# Patient Record
Sex: MALE | Race: WHITE | NOT HISPANIC OR LATINO | Employment: FULL TIME | URBAN - NONMETROPOLITAN AREA
[De-identification: names, ages, dates, MRNs, and addresses within clinical notes are randomized per-mention and may not be internally consistent; named-entity substitution may affect disease eponyms.]

---

## 2020-10-09 ENCOUNTER — HOSPITAL ENCOUNTER (EMERGENCY)
Facility: HOSPITAL | Age: 25
Discharge: LEFT BEFORE DISCHARGE | End: 2020-10-09

## 2020-10-10 ENCOUNTER — APPOINTMENT (OUTPATIENT)
Dept: GENERAL RADIOLOGY | Facility: HOSPITAL | Age: 25
End: 2020-10-10

## 2020-10-10 ENCOUNTER — HOSPITAL ENCOUNTER (INPATIENT)
Facility: HOSPITAL | Age: 25
LOS: 1 days | Discharge: HOME | End: 2020-10-11
Attending: EMERGENCY MEDICINE | Admitting: INTERNAL MEDICINE

## 2020-10-10 DIAGNOSIS — I38 ENDOCARDITIS, UNSPECIFIED CHRONICITY, UNSPECIFIED ENDOCARDITIS TYPE: Primary | ICD-10-CM

## 2020-10-10 PROBLEM — I10 UNCONTROLLED HYPERTENSION: Status: ACTIVE | Noted: 2020-10-10

## 2020-10-10 PROBLEM — E87.6 HYPOKALEMIA: Status: ACTIVE | Noted: 2020-10-10

## 2020-10-10 PROBLEM — F10.10 ALCOHOL ABUSE: Status: ACTIVE | Noted: 2020-10-10

## 2020-10-10 PROBLEM — I33.0 BACTERIAL ENDOCARDITIS: Status: ACTIVE | Noted: 2020-10-10

## 2020-10-10 PROBLEM — Z72.0 TOBACCO ABUSE: Status: ACTIVE | Noted: 2020-10-10

## 2020-10-10 PROBLEM — Z95.2 HISTORY OF MECHANICAL AORTIC VALVE REPLACEMENT: Status: ACTIVE | Noted: 2020-10-10

## 2020-10-10 PROBLEM — Z79.01 CHRONIC ANTICOAGULATION: Status: ACTIVE | Noted: 2020-10-10

## 2020-10-10 PROBLEM — F19.11 HISTORY OF INTRAVENOUS DRUG ABUSE (HCC): Status: ACTIVE | Noted: 2020-10-10

## 2020-10-10 LAB
AMPHET+METHAMPHET UR QL: NEGATIVE
AMPHETAMINES UR QL: NEGATIVE
ANION GAP SERPL CALCULATED.3IONS-SCNC: 11 MMOL/L (ref 5–15)
ANION GAP SERPL CALCULATED.3IONS-SCNC: 8 MMOL/L (ref 5–15)
APTT PPP: 28.8 SECONDS (ref 24.1–35)
BARBITURATES UR QL SCN: NEGATIVE
BASOPHILS # BLD AUTO: 0.06 10*3/MM3 (ref 0–0.2)
BASOPHILS # BLD AUTO: 0.06 10*3/MM3 (ref 0–0.2)
BASOPHILS NFR BLD AUTO: 0.5 % (ref 0–1.5)
BASOPHILS NFR BLD AUTO: 0.7 % (ref 0–1.5)
BENZODIAZ UR QL SCN: NEGATIVE
BUN SERPL-MCNC: 13 MG/DL (ref 6–20)
BUN SERPL-MCNC: 14 MG/DL (ref 6–20)
BUN/CREAT SERPL: 14.8 (ref 7–25)
BUN/CREAT SERPL: 18.2 (ref 7–25)
BUPRENORPHINE SERPL-MCNC: NEGATIVE NG/ML
CALCIUM SPEC-SCNC: 9 MG/DL (ref 8.6–10.5)
CALCIUM SPEC-SCNC: 9.2 MG/DL (ref 8.6–10.5)
CANNABINOIDS SERPL QL: POSITIVE
CHLORIDE SERPL-SCNC: 102 MMOL/L (ref 98–107)
CHLORIDE SERPL-SCNC: 104 MMOL/L (ref 98–107)
CK SERPL-CCNC: 123 U/L (ref 20–200)
CO2 SERPL-SCNC: 25 MMOL/L (ref 22–29)
CO2 SERPL-SCNC: 28 MMOL/L (ref 22–29)
COCAINE UR QL: NEGATIVE
CREAT SERPL-MCNC: 0.77 MG/DL (ref 0.76–1.27)
CREAT SERPL-MCNC: 0.88 MG/DL (ref 0.76–1.27)
CRP SERPL-MCNC: 0.36 MG/DL (ref 0–0.5)
DEPRECATED RDW RBC AUTO: 39.4 FL (ref 37–54)
DEPRECATED RDW RBC AUTO: 39.6 FL (ref 37–54)
EOSINOPHIL # BLD AUTO: 0.06 10*3/MM3 (ref 0–0.4)
EOSINOPHIL # BLD AUTO: 0.07 10*3/MM3 (ref 0–0.4)
EOSINOPHIL NFR BLD AUTO: 0.5 % (ref 0.3–6.2)
EOSINOPHIL NFR BLD AUTO: 0.8 % (ref 0.3–6.2)
ERYTHROCYTE [DISTWIDTH] IN BLOOD BY AUTOMATED COUNT: 12.6 % (ref 12.3–15.4)
ERYTHROCYTE [DISTWIDTH] IN BLOOD BY AUTOMATED COUNT: 12.7 % (ref 12.3–15.4)
ERYTHROCYTE [SEDIMENTATION RATE] IN BLOOD: 1 MM/HR (ref 0–15)
GFR SERPL CREATININE-BSD FRML MDRD: 105 ML/MIN/1.73
GFR SERPL CREATININE-BSD FRML MDRD: 122 ML/MIN/1.73
GLUCOSE SERPL-MCNC: 103 MG/DL (ref 65–99)
GLUCOSE SERPL-MCNC: 122 MG/DL (ref 65–99)
HCT VFR BLD AUTO: 45.6 % (ref 37.5–51)
HCT VFR BLD AUTO: 45.9 % (ref 37.5–51)
HGB BLD-MCNC: 15.4 G/DL (ref 13–17.7)
HGB BLD-MCNC: 15.8 G/DL (ref 13–17.7)
IMM GRANULOCYTES # BLD AUTO: 0.03 10*3/MM3 (ref 0–0.05)
IMM GRANULOCYTES # BLD AUTO: 0.1 10*3/MM3 (ref 0–0.05)
IMM GRANULOCYTES NFR BLD AUTO: 0.3 % (ref 0–0.5)
IMM GRANULOCYTES NFR BLD AUTO: 0.9 % (ref 0–0.5)
INR PPP: 1.11 (ref 0.91–1.09)
INR PPP: 1.17 (ref 0.91–1.09)
LYMPHOCYTES # BLD AUTO: 1.36 10*3/MM3 (ref 0.7–3.1)
LYMPHOCYTES # BLD AUTO: 1.71 10*3/MM3 (ref 0.7–3.1)
LYMPHOCYTES NFR BLD AUTO: 11.8 % (ref 19.6–45.3)
LYMPHOCYTES NFR BLD AUTO: 18.8 % (ref 19.6–45.3)
MAGNESIUM SERPL-MCNC: 2.1 MG/DL (ref 1.6–2.6)
MCH RBC QN AUTO: 29.3 PG (ref 26.6–33)
MCH RBC QN AUTO: 29.6 PG (ref 26.6–33)
MCHC RBC AUTO-ENTMCNC: 33.8 G/DL (ref 31.5–35.7)
MCHC RBC AUTO-ENTMCNC: 34.4 G/DL (ref 31.5–35.7)
MCV RBC AUTO: 86.1 FL (ref 79–97)
MCV RBC AUTO: 86.9 FL (ref 79–97)
METHADONE UR QL SCN: NEGATIVE
MONOCYTES # BLD AUTO: 0.73 10*3/MM3 (ref 0.1–0.9)
MONOCYTES # BLD AUTO: 0.74 10*3/MM3 (ref 0.1–0.9)
MONOCYTES NFR BLD AUTO: 6.4 % (ref 5–12)
MONOCYTES NFR BLD AUTO: 8 % (ref 5–12)
NEUTROPHILS NFR BLD AUTO: 6.49 10*3/MM3 (ref 1.7–7)
NEUTROPHILS NFR BLD AUTO: 71.4 % (ref 42.7–76)
NEUTROPHILS NFR BLD AUTO: 79.9 % (ref 42.7–76)
NEUTROPHILS NFR BLD AUTO: 9.24 10*3/MM3 (ref 1.7–7)
NRBC BLD AUTO-RTO: 0 /100 WBC (ref 0–0.2)
NRBC BLD AUTO-RTO: 0 /100 WBC (ref 0–0.2)
OPIATES UR QL: NEGATIVE
OXYCODONE UR QL SCN: NEGATIVE
PCP UR QL SCN: NEGATIVE
PLATELET # BLD AUTO: 217 10*3/MM3 (ref 140–450)
PLATELET # BLD AUTO: 220 10*3/MM3 (ref 140–450)
PMV BLD AUTO: 10.3 FL (ref 6–12)
PMV BLD AUTO: 10.3 FL (ref 6–12)
POTASSIUM SERPL-SCNC: 3 MMOL/L (ref 3.5–5.2)
POTASSIUM SERPL-SCNC: 3.4 MMOL/L (ref 3.5–5.2)
PROPOXYPH UR QL: NEGATIVE
PROTHROMBIN TIME: 13.9 SECONDS (ref 11.9–14.6)
PROTHROMBIN TIME: 14.5 SECONDS (ref 11.9–14.6)
RBC # BLD AUTO: 5.25 10*6/MM3 (ref 4.14–5.8)
RBC # BLD AUTO: 5.33 10*6/MM3 (ref 4.14–5.8)
SARS-COV-2 RDRP RESP QL NAA+PROBE: NOT DETECTED
SODIUM SERPL-SCNC: 138 MMOL/L (ref 136–145)
SODIUM SERPL-SCNC: 140 MMOL/L (ref 136–145)
TRICYCLICS UR QL SCN: NEGATIVE
TROPONIN T SERPL-MCNC: <0.01 NG/ML (ref 0–0.03)
WBC # BLD AUTO: 11.56 10*3/MM3 (ref 3.4–10.8)
WBC # BLD AUTO: 9.09 10*3/MM3 (ref 3.4–10.8)

## 2020-10-10 PROCEDURE — 82550 ASSAY OF CK (CPK): CPT | Performed by: EMERGENCY MEDICINE

## 2020-10-10 PROCEDURE — 25010000003 POTASSIUM CHLORIDE 10 MEQ/100ML SOLUTION: Performed by: FAMILY MEDICINE

## 2020-10-10 PROCEDURE — 85730 THROMBOPLASTIN TIME PARTIAL: CPT | Performed by: EMERGENCY MEDICINE

## 2020-10-10 PROCEDURE — 25010000002 HYDRALAZINE PER 20 MG: Performed by: EMERGENCY MEDICINE

## 2020-10-10 PROCEDURE — 25010000002 VANCOMYCIN PER 500 MG: Performed by: FAMILY MEDICINE

## 2020-10-10 PROCEDURE — 93010 ELECTROCARDIOGRAM REPORT: CPT | Performed by: INTERNAL MEDICINE

## 2020-10-10 PROCEDURE — 85025 COMPLETE CBC W/AUTO DIFF WBC: CPT | Performed by: EMERGENCY MEDICINE

## 2020-10-10 PROCEDURE — 25010000002 ENOXAPARIN PER 10 MG: Performed by: EMERGENCY MEDICINE

## 2020-10-10 PROCEDURE — 85025 COMPLETE CBC W/AUTO DIFF WBC: CPT | Performed by: FAMILY MEDICINE

## 2020-10-10 PROCEDURE — 71045 X-RAY EXAM CHEST 1 VIEW: CPT

## 2020-10-10 PROCEDURE — 87040 BLOOD CULTURE FOR BACTERIA: CPT | Performed by: EMERGENCY MEDICINE

## 2020-10-10 PROCEDURE — 85610 PROTHROMBIN TIME: CPT | Performed by: EMERGENCY MEDICINE

## 2020-10-10 PROCEDURE — 80048 BASIC METABOLIC PNL TOTAL CA: CPT | Performed by: EMERGENCY MEDICINE

## 2020-10-10 PROCEDURE — 99254 IP/OBS CNSLTJ NEW/EST MOD 60: CPT | Performed by: INTERNAL MEDICINE

## 2020-10-10 PROCEDURE — 25010000002 VANCOMYCIN: Performed by: EMERGENCY MEDICINE

## 2020-10-10 PROCEDURE — 80048 BASIC METABOLIC PNL TOTAL CA: CPT | Performed by: FAMILY MEDICINE

## 2020-10-10 PROCEDURE — 86140 C-REACTIVE PROTEIN: CPT | Performed by: FAMILY MEDICINE

## 2020-10-10 PROCEDURE — 87635 SARS-COV-2 COVID-19 AMP PRB: CPT | Performed by: EMERGENCY MEDICINE

## 2020-10-10 PROCEDURE — 99284 EMERGENCY DEPT VISIT MOD MDM: CPT

## 2020-10-10 PROCEDURE — 25010000002 ENOXAPARIN PER 10 MG: Performed by: FAMILY MEDICINE

## 2020-10-10 PROCEDURE — 80306 DRUG TEST PRSMV INSTRMNT: CPT | Performed by: EMERGENCY MEDICINE

## 2020-10-10 PROCEDURE — 83735 ASSAY OF MAGNESIUM: CPT | Performed by: FAMILY MEDICINE

## 2020-10-10 PROCEDURE — 93005 ELECTROCARDIOGRAM TRACING: CPT | Performed by: EMERGENCY MEDICINE

## 2020-10-10 PROCEDURE — 85610 PROTHROMBIN TIME: CPT | Performed by: FAMILY MEDICINE

## 2020-10-10 PROCEDURE — C9803 HOPD COVID-19 SPEC COLLECT: HCPCS | Performed by: EMERGENCY MEDICINE

## 2020-10-10 PROCEDURE — 25010000002 CEFTRIAXONE PER 250 MG: Performed by: EMERGENCY MEDICINE

## 2020-10-10 PROCEDURE — 85651 RBC SED RATE NONAUTOMATED: CPT | Performed by: FAMILY MEDICINE

## 2020-10-10 PROCEDURE — 25010000002 ONDANSETRON PER 1 MG: Performed by: FAMILY MEDICINE

## 2020-10-10 PROCEDURE — 84484 ASSAY OF TROPONIN QUANT: CPT | Performed by: EMERGENCY MEDICINE

## 2020-10-10 RX ORDER — METOPROLOL TARTRATE 5 MG/5ML
5 INJECTION INTRAVENOUS EVERY 6 HOURS SCHEDULED
Status: DISCONTINUED | OUTPATIENT
Start: 2020-10-10 | End: 2020-10-10

## 2020-10-10 RX ORDER — THIAMINE MONONITRATE (VIT B1) 100 MG
100 TABLET ORAL ONCE
Status: COMPLETED | OUTPATIENT
Start: 2020-10-10 | End: 2020-10-10

## 2020-10-10 RX ORDER — LORAZEPAM 1 MG/1
1 TABLET ORAL
Status: DISCONTINUED | OUTPATIENT
Start: 2020-10-10 | End: 2020-10-11 | Stop reason: HOSPADM

## 2020-10-10 RX ORDER — VANCOMYCIN HYDROCHLORIDE 1 G/200ML
1000 INJECTION, SOLUTION INTRAVENOUS EVERY 8 HOURS
Status: DISCONTINUED | OUTPATIENT
Start: 2020-10-10 | End: 2020-10-11 | Stop reason: HOSPADM

## 2020-10-10 RX ORDER — WARFARIN SODIUM 5 MG/1
5 TABLET ORAL
Status: DISCONTINUED | OUTPATIENT
Start: 2020-10-10 | End: 2020-10-11 | Stop reason: HOSPADM

## 2020-10-10 RX ORDER — SODIUM CHLORIDE 9 MG/ML
75 INJECTION, SOLUTION INTRAVENOUS CONTINUOUS
Status: DISCONTINUED | OUTPATIENT
Start: 2020-10-10 | End: 2020-10-11 | Stop reason: HOSPADM

## 2020-10-10 RX ORDER — HYDRALAZINE HYDROCHLORIDE 20 MG/ML
20 INJECTION INTRAMUSCULAR; INTRAVENOUS ONCE
Status: COMPLETED | OUTPATIENT
Start: 2020-10-10 | End: 2020-10-10

## 2020-10-10 RX ORDER — ONDANSETRON 2 MG/ML
4 INJECTION INTRAMUSCULAR; INTRAVENOUS EVERY 6 HOURS PRN
Status: DISCONTINUED | OUTPATIENT
Start: 2020-10-10 | End: 2020-10-11 | Stop reason: HOSPADM

## 2020-10-10 RX ORDER — LORAZEPAM 2 MG/ML
2 INJECTION INTRAMUSCULAR
Status: DISCONTINUED | OUTPATIENT
Start: 2020-10-10 | End: 2020-10-11 | Stop reason: HOSPADM

## 2020-10-10 RX ORDER — SODIUM CHLORIDE 0.9 % (FLUSH) 0.9 %
10 SYRINGE (ML) INJECTION EVERY 12 HOURS SCHEDULED
Status: DISCONTINUED | OUTPATIENT
Start: 2020-10-10 | End: 2020-10-11 | Stop reason: HOSPADM

## 2020-10-10 RX ORDER — SODIUM CHLORIDE 0.9 % (FLUSH) 0.9 %
10 SYRINGE (ML) INJECTION AS NEEDED
Status: DISCONTINUED | OUTPATIENT
Start: 2020-10-10 | End: 2020-10-11 | Stop reason: HOSPADM

## 2020-10-10 RX ORDER — WARFARIN SODIUM 5 MG/1
5 TABLET ORAL
Status: COMPLETED | OUTPATIENT
Start: 2020-10-10 | End: 2020-10-10

## 2020-10-10 RX ORDER — POTASSIUM CHLORIDE 750 MG/1
40 CAPSULE, EXTENDED RELEASE ORAL ONCE
Status: COMPLETED | OUTPATIENT
Start: 2020-10-10 | End: 2020-10-10

## 2020-10-10 RX ORDER — LISINOPRIL 5 MG/1
5 TABLET ORAL
Status: DISCONTINUED | OUTPATIENT
Start: 2020-10-10 | End: 2020-10-11 | Stop reason: HOSPADM

## 2020-10-10 RX ORDER — NICOTINE 21 MG/24HR
1 PATCH, TRANSDERMAL 24 HOURS TRANSDERMAL EVERY 24 HOURS
Status: DISCONTINUED | OUTPATIENT
Start: 2020-10-10 | End: 2020-10-11 | Stop reason: HOSPADM

## 2020-10-10 RX ORDER — POTASSIUM CHLORIDE 7.45 MG/ML
10 INJECTION INTRAVENOUS
Status: COMPLETED | OUTPATIENT
Start: 2020-10-10 | End: 2020-10-10

## 2020-10-10 RX ORDER — VANCOMYCIN HYDROCHLORIDE 1 G/200ML
1000 INJECTION, SOLUTION INTRAVENOUS EVERY 12 HOURS
Status: DISCONTINUED | OUTPATIENT
Start: 2020-10-10 | End: 2020-10-10 | Stop reason: DRUGHIGH

## 2020-10-10 RX ORDER — LORAZEPAM 2 MG/ML
1 INJECTION INTRAMUSCULAR
Status: DISCONTINUED | OUTPATIENT
Start: 2020-10-10 | End: 2020-10-11 | Stop reason: HOSPADM

## 2020-10-10 RX ORDER — LORAZEPAM 1 MG/1
2 TABLET ORAL
Status: DISCONTINUED | OUTPATIENT
Start: 2020-10-10 | End: 2020-10-11 | Stop reason: HOSPADM

## 2020-10-10 RX ORDER — LABETALOL HYDROCHLORIDE 5 MG/ML
10 INJECTION, SOLUTION INTRAVENOUS EVERY 6 HOURS PRN
Status: DISCONTINUED | OUTPATIENT
Start: 2020-10-10 | End: 2020-10-11 | Stop reason: HOSPADM

## 2020-10-10 RX ORDER — ACETAMINOPHEN 325 MG/1
650 TABLET ORAL EVERY 4 HOURS PRN
Status: DISCONTINUED | OUTPATIENT
Start: 2020-10-10 | End: 2020-10-11 | Stop reason: HOSPADM

## 2020-10-10 RX ADMIN — POTASSIUM CHLORIDE 40 MEQ: 750 CAPSULE, EXTENDED RELEASE ORAL at 14:09

## 2020-10-10 RX ADMIN — POTASSIUM CHLORIDE 10 MEQ: 7.46 INJECTION, SOLUTION INTRAVENOUS at 10:28

## 2020-10-10 RX ADMIN — ACETAMINOPHEN 650 MG: 325 TABLET, FILM COATED ORAL at 08:10

## 2020-10-10 RX ADMIN — METOPROLOL TARTRATE 25 MG: 25 TABLET, FILM COATED ORAL at 14:09

## 2020-10-10 RX ADMIN — VANCOMYCIN HYDROCHLORIDE 1000 MG: 1 INJECTION, SOLUTION INTRAVENOUS at 14:09

## 2020-10-10 RX ADMIN — HYDRALAZINE HYDROCHLORIDE 20 MG: 20 INJECTION INTRAMUSCULAR; INTRAVENOUS at 02:06

## 2020-10-10 RX ADMIN — WARFARIN SODIUM 5 MG: 5 TABLET ORAL at 17:01

## 2020-10-10 RX ADMIN — CEFTRIAXONE SODIUM 2 G: 2 INJECTION, POWDER, FOR SOLUTION INTRAMUSCULAR; INTRAVENOUS at 03:51

## 2020-10-10 RX ADMIN — SODIUM CHLORIDE, PRESERVATIVE FREE 10 ML: 5 INJECTION INTRAVENOUS at 08:05

## 2020-10-10 RX ADMIN — SODIUM CHLORIDE, PRESERVATIVE FREE 10 ML: 5 INJECTION INTRAVENOUS at 21:18

## 2020-10-10 RX ADMIN — METOPROLOL TARTRATE 25 MG: 25 TABLET, FILM COATED ORAL at 21:17

## 2020-10-10 RX ADMIN — SODIUM CHLORIDE 75 ML/HR: 9 INJECTION, SOLUTION INTRAVENOUS at 08:19

## 2020-10-10 RX ADMIN — POTASSIUM CHLORIDE 10 MEQ: 7.46 INJECTION, SOLUTION INTRAVENOUS at 08:19

## 2020-10-10 RX ADMIN — HYDRALAZINE HYDROCHLORIDE 20 MG: 20 INJECTION INTRAMUSCULAR; INTRAVENOUS at 03:58

## 2020-10-10 RX ADMIN — ENOXAPARIN SODIUM 90 MG: 100 INJECTION SUBCUTANEOUS at 02:21

## 2020-10-10 RX ADMIN — WARFARIN SODIUM 5 MG: 5 TABLET ORAL at 03:55

## 2020-10-10 RX ADMIN — ENOXAPARIN SODIUM 90 MG: 100 INJECTION SUBCUTANEOUS at 17:01

## 2020-10-10 RX ADMIN — VANCOMYCIN HYDROCHLORIDE 1750 MG: 1 INJECTION, POWDER, LYOPHILIZED, FOR SOLUTION INTRAVENOUS at 04:31

## 2020-10-10 RX ADMIN — METOPROLOL TARTRATE 5 MG: 5 INJECTION INTRAVENOUS at 08:05

## 2020-10-10 RX ADMIN — LISINOPRIL 5 MG: 5 TABLET ORAL at 14:09

## 2020-10-10 RX ADMIN — VANCOMYCIN HYDROCHLORIDE 1000 MG: 1 INJECTION, SOLUTION INTRAVENOUS at 21:17

## 2020-10-10 RX ADMIN — ONDANSETRON HYDROCHLORIDE 4 MG: 2 SOLUTION INTRAMUSCULAR; INTRAVENOUS at 11:41

## 2020-10-10 RX ADMIN — THIAMINE HCL TAB 100 MG 100 MG: 100 TAB at 14:34

## 2020-10-10 NOTE — CONSULTS
"[facility], [facility] Consult Note    Referring Provider: giuseppe  Reason for Consultation: possible endocarditis    Patient Care Team:  Provider, No Known as PCP - General    Chief complaint chills    Subjective .     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  He presents after having some chills and night sweats.  He denies CP, heart palpitations, or SOB.  He denies further IV drug use.  He denies objective or subjective fever.      Review of Systems  A 10-point review of systems is obtained and negative except for otherwise mentioned above.    History  Past Medical History:   Diagnosis Date   • Endocarditis 2011   ,   Past Surgical History:   Procedure Laterality Date   • AORTIC VALVE REPAIR/REPLACEMENT  2011   , History reviewed. No pertinent family history.,   Social History     Tobacco Use   • Smoking status: Heavy Tobacco Smoker     Packs/day: 0.50     Years: 13.00     Pack years: 6.50     Types: Cigarettes   Substance Use Topics   • Alcohol use: Yes     Alcohol/week: 24.0 standard drinks     Types: 24 Cans of beer per week   • Drug use: Yes     Frequency: 7.0 times per week     Types: Marijuana   ,   No medications prior to admission.    and Allergies:  Patient has no known allergies.    Objective     Vital Sign Min/Max for last 24 hours  Temp  Min: 97.9 °F (36.6 °C)  Max: 98.6 °F (37 °C)   BP  Min: 137/88  Max: 185/124   Pulse  Min: 69  Max: 126   Resp  Min: 18  Max: 20   SpO2  Min: 95 %  Max: 100 %   No data recorded   Weight  Min: 88.5 kg (195 lb)  Max: 89.1 kg (196 lb 6.4 oz)     Flowsheet Rows      First Filed Value   Admission Height  162.6 cm (64\") Documented at 10/10/2020 0019   Admission Weight  88.5 kg (195 lb) Documented at 10/10/2020 0019             Physical Exam:     General Appearance:    Alert, cooperative, in no acute distress "   Head:    Normocephalic, without obvious abnormality, atraumatic   Eyes:            Lids and lashes normal, conjunctivae and sclerae normal, no   icterus, PERRLA, EOMI   Throat:   Oral mucosa pink and moist   Neck:   No adenopathy, supple, trachea midline, no thyromegaly, no   carotid bruit, no JVD   Lungs:     Clear to auscultation bilaterally,respirations regular, even     and unlabored    Heart:    Regular rhythm and normal rate, normal S1 and S2, no            murmur, no gallop, no rub, no click, crisp heart tones   Chest Wall:    No abnormalities observed   Abdomen:     Normal bowel sounds present in all four quadrants, no       masses, no organomegaly, soft non-tender, non-distended    Extremities:   No edema, no cyanosis, no clubbing   Pulses:   Pulses palpable and equal bilaterally   Skin:   No bleeding, bruising or rash   Psychiatric:   Displays appropriate mood and affect           Results Review:    I reviewed the patient's new clinical results.    Results from last 7 days   Lab Units 10/10/20  0646   WBC 10*3/mm3 11.56*   HEMOGLOBIN g/dL 15.8   HEMATOCRIT % 45.9   PLATELETS 10*3/mm3 217     Results from last 7 days   Lab Units 10/10/20  0646   SODIUM mmol/L 140   POTASSIUM mmol/L 3.0*   CHLORIDE mmol/L 104   CO2 mmol/L 25.0   BUN mg/dL 13   CREATININE mg/dL 0.88   GLUCOSE mg/dL 122*   CALCIUM mg/dL 9.0     Results from last 7 days   Lab Units 10/10/20  0646   SODIUM mmol/L 140   POTASSIUM mmol/L 3.0*   CHLORIDE mmol/L 104   CO2 mmol/L 25.0   BUN mg/dL 13   CREATININE mg/dL 0.88   CALCIUM mg/dL 9.0   GLUCOSE mg/dL 122*     Results from last 7 days   Lab Units 10/10/20  0159   CK TOTAL U/L 123   TROPONIN T ng/mL <0.010         Assessment/Plan       Bacterial endocarditis, rule out    Uncontrolled hypertension    Alcohol abuse    Tobacco abuse    History of intravenous drug abuse (CMS/HCC)    Chronic anticoagulation    History of mechanical aortic valve replacement    Hypokalemia  Subtherapeutic INR    D/t  h/o IVDA and bacterial endocarditis and now presently with night sweats and chills, there was concern for recurrent SBE.  Will check 2D echo to evaluate valves.  Agree with Lovenox coverage.  Encourage pt compliance.  Thank you for the consult.  Further recommendations to follow from Dr. Villatoro.    I discussed the patient's findings and my recommendations with patient and primary care team    Jagruti Lam PA-C  10/10/20  13:23 CDT

## 2020-10-10 NOTE — PROGRESS NOTES
Admitted after midnight by Dr. Smith.     Discussed with his nurse, Jeanna.     Patient has a history of a mechanical aortic valve replacement several years ago secondary to bacterial endocarditis from IV drug abuse.  He was admitted at the request of the ER as he has been experiencing night sweats.  There is some concern about possible recurrence.  He apparently continues to drink and smoke heavily, but denies IVDA.  Urine drug screen positive for THC.    Blood cultures negative thus far.  The patient was placed on ceftriaxone and vancomycin for now.  CRP completely normal.  No fever.  Slight leukocytosis.    Cardiology was consulted to evaluate and possibly do a transesophageal echocardiogram.    He is on therapeutic Lovenox for now as he is subtherapeutic with regards to his Coumadin.  INR 1.17.    CIWA protocol.  Prophylactic vitamin replacement.  Offered a nicotine patch and counseled for tobacco cessation.    Replace potassium.  Magnesium normal.  Blood pressure improving with as needed medications, but will start scheduled medications.    Active Hospital Problems    Diagnosis   • **Bacterial endocarditis, rule out   • History of intravenous drug abuse (CMS/HCC)   • Chronic anticoagulation   • History of mechanical aortic valve replacement   • Uncontrolled hypertension   • Alcohol abuse   • Tobacco abuse   • Hypokalemia     Electronically signed by Den Servin DO, 10/10/20, 11:59 AM CDT.

## 2020-10-10 NOTE — CONSULTS
"Pharmacy Dosing Service  Pharmacokinetics  Vancomycin Initial Evaluation  Assessment/Action/Plan:  Loading dose: 1,750 mg  Current Order: Vancomycin 1,000 mg IVPB every 8 hours (adjusted from 1,000 mg every 12 hours with accompanying Pharmacy to Dose Request)  Current end date/final dose: 10/15/20 at 0500  Levels: No levels ordered at this time  Additional antimicrobial agent(s): Rocephin 2gm IVPB every 24 hours    Vancomycin dosage initiated based on population pharmacokinetic parameters. Pharmacy will continue to follow daily and adjust dose accordingly.     Subjective:  Sher Drew is a 24 y.o. male with a Vancomycin \"Pharmacy to Dose\" consult for the treatment of Endocarditis , day 1 of 5 of treatment.    AUC Model Data: Luigi model  Regimen: 1000 mg every 8 hours for 15 doses.  Start time: 13:00 on 10/10/2020  Exposure target: AUC24 (range)400-600 mg/L.hr  AUC24,ss: 555 mg/L.hr  PAUC*: 73 %  Ctrough,ss: 16.7 mg/L  Pconc*: 41 %  Tox.: 12 %    Objective:  Ht: 162.6 cm (64\"); Wt: 89.1 kg (196 lb 6.4 oz)  Estimated Creatinine Clearance: 146.4 mL/min (by C-G formula based on SCr of 0.77 mg/dL).   Creatinine   Date Value Ref Range Status   10/10/2020 0.77 0.76 - 1.27 mg/dL Final      Lab Results   Component Value Date    WBC 9.09 10/10/2020      Baseline culture results:  Microbiology Results (last 10 days)       Procedure Component Value - Date/Time    COVID PRE-OP / PRE-PROCEDURE SCREENING ORDER (NO ISOLATION) - Swab, Nasal Cavity [[id number]]  (Normal) Collected: 10/10/20 0205    Lab Status: Final result Specimen: Swab from Nasal Cavity Updated: 10/10/20 0242    Narrative:      The following orders were created for panel order COVID PRE-OP / PRE-PROCEDURE SCREENING ORDER (NO ISOLATION) - Swab, Nasal Cavity.  Procedure                               Abnormality         Status                     ---------                               -----------         ------                     COVID-19, ARRIETA " IN-HOUS...[[id number]]  Normal              Final result                 Please view results for these tests on the individual orders.    COVID-19, [facility]BBOTT IN-HOUSE,NP Swab (NO TRANSPORT MEDIA) 2 HR TAT - Swab, Nasal Cavity [[id number]]  (Normal) Collected: 10/10/20 0205    Lab Status: Final result Specimen: Swab from Nasal Cavity Updated: 10/10/20 0242     COVID19 Not Detected    Narrative:      Fact sheet for providers: https://www.fda.gov/media/280233/download     Fact sheet for patients: https://www.fda.gov/media/299018/download            Lauri Flores PharmD  10/10/20 06:58 CDT

## 2020-10-10 NOTE — ED PROVIDER NOTES
"Subjective   23 y/o male who had valve replacement surgery in [place] at the age of 15 secondary to IVDA. He tells me he has not been compliant with his medications actually not having taken his coumadin in 3 months and similar with the rest of his cardiac medications. He arrives today for evaluation of night sweats for several weeks that he states is \"more than normal sweating\" as well as post prandial nausea that he states is intermittent stating he can \"sometimes eat a steak and feel fine but then other times eat something small and feel sick.\" He denies any fevers or chills, cp, sob, current IVDA, current nausea or vomiting, RESENDEZ, weakness, numbness or tingling. He arrives in [facility].           Family, social and past history reviewed as below, prior documentation of H and Ps and other documentation are reviewed:    No past medical history on file.    No past surgical history on file.    Social History    Socioeconomic History      Marital status: Single      Spouse name: Not on file      Number of children: Not on file      Years of education: Not on file      Highest education level: Not on file      Family history: reviewed and noncontributory           Review of Systems   All other systems reviewed and are negative.      No past medical history on file.    No Known Allergies    No past surgical history on file.    No family history on file.    Social History     Socioeconomic History   • Marital status: Single     Spouse name: Not on file   • Number of children: Not on file   • Years of education: Not on file   • Highest education level: Not on file           Objective   Physical Exam  Vitals signs and nursing note reviewed.   Constitutional:       Appearance: Normal appearance.   HENT:      Head: Normocephalic.      Nose: Nose normal.      Mouth/Throat:      Mouth: Mucous membranes are moist.   Eyes:      Extraocular Movements: Extraocular movements intact.      Pupils: Pupils are equal, round, and reactive to " light.   Neck:      Musculoskeletal: Normal range of motion and neck supple.   Cardiovascular:      Rate and Rhythm: Normal rate and regular rhythm.      Pulses: Normal pulses.      Heart sounds: Normal heart sounds.      Comments: Click from mechanical valve   Pulmonary:      Effort: Pulmonary effort is normal. No respiratory distress.      Breath sounds: Normal breath sounds. No stridor. No wheezing or rhonchi.   Abdominal:      General: Abdomen is flat. Bowel sounds are normal.   Musculoskeletal: Normal range of motion.         General: No swelling or tenderness.   Skin:     General: Skin is warm and dry.      Capillary Refill: Capillary refill takes less than 2 seconds.      Coloration: Skin is not jaundiced or pale.      Findings: No bruising, erythema, lesion or rash.      Comments: No nodules, no lesions    Neurological:      General: No focal deficit present.      Mental Status: He is alert and oriented to person, place, and time.   Psychiatric:         Mood and Affect: Mood normal.         Thought Content: Thought content normal.         Procedures           ED Course  ED Course as of Oct 10 0330   Sat Oct 10, 2020   0325 Dr. Sanchez recommends admission, lovenox and coumadin. He also feels the pateint may have endocarditis and recommends NOEMI in am    [JH]      ED Course User Index  [] Carter Manuel MD            XR Chest 1 View   ED Interpretation   cardiomegly        Labs Reviewed   BASIC METABOLIC PANEL - Abnormal; Notable for the following components:       Result Value    Glucose 103 (*)     Potassium 3.4 (*)     All other components within normal limits    Narrative:     GFR Normal >60  Chronic Kidney Disease <60  Kidney Failure <15     PROTIME-INR - Abnormal; Notable for the following components:    INR 1.11 (*)     All other components within normal limits   CBC WITH AUTO DIFFERENTIAL - Abnormal; Notable for the following components:    Lymphocyte % 18.8 (*)     All other components within  normal limits   COVID-19,[facility]OTT IN-HOUSE,NP SWAB (NO TRANSPORT MEDIA) 2 HR TAT - Normal    Narrative:     Fact sheet for providers: https://www.fda.gov/media/878241/download     Fact sheet for patients: https://www.fda.gov/media/971379/download   APTT - Normal   TROPONIN (IN-HOUSE) - Normal    Narrative:     Troponin T Reference Range:  <= 0.03 ng/mL-   Negative for AMI  >0.03 ng/mL-     Abnormal for myocardial necrosis.  Clinicians would have to utilize clinical acumen, EKG, Troponin and serial changes to determine if it is an Acute Myocardial Infarction or myocardial injury due to an underlying chronic condition.       Results may be falsely decreased if patient taking Biotin.     CK - Normal   COVID PRE-OP / PRE-PROCEDURE SCREENING ORDER (NO ISOLATION)    Narrative:     The following orders were created for panel order COVID PRE-OP / PRE-PROCEDURE SCREENING ORDER (NO ISOLATION) - Swab, Nasal Cavity.  Procedure                               Abnormality         Status                     ---------                               -----------         ------                     COVID-19, ABBOTT IN-HOUS...[[id number]]  Normal              Final result                 Please view results for these tests on the individual orders.   BLOOD CULTURE   BLOOD CULTURE   URINE DRUG SCREEN   CBC AND DIFFERENTIAL    Narrative:     The following orders were created for panel order CBC & Differential.  Procedure                               Abnormality         Status                     ---------                               -----------         ------                     CBC Auto Differential[[id number]]        Abnormal            Final result                 Please view results for these tests on the individual orders.                                       MDM    Final diagnoses:   Endocarditis, unspecified chronicity, unspecified endocarditis type            Carter Manuel MD  10/10/20 [id number]

## 2020-10-10 NOTE — H&P
"    [facility]  HISTORY AND PHYSICAL    Date of Admission: 10/10/2020  Primary Care Physician: Provider, No Known    Subjective     Chief Complaint: night sweats    History of Present Illness  24 year old male with AVR mechanical valve due to IVD endocarditis that presents with complaints of night sweats for a week. He stopped using IV drugs after surgery. Denies nausea, vomiting, diarrhea, abdominal pain, skin rash.     His blood pressure has been elevated on presentation. Patient had stopped taking his prescriptions. Concerns was with endocarditis Dr Manuel reported he spoke with Laura who recommended admission for evaluation and NOEMI.     Review of Systems   Otherwise complete ROS reviewed and negative except as mentioned in the HPI.    Past Medical History:   Past Medical History:   Diagnosis Date   • Endocarditis 2011     Past Surgical History:  Past Surgical History:   Procedure Laterality Date   • AORTIC VALVE REPAIR/REPLACEMENT  2011     Social History:  reports that he has been smoking cigarettes. He has a 6.50 pack-year smoking history. He does not have any smokeless tobacco history on file. He reports current alcohol use of about 24.0 standard drinks of alcohol per week. He reports current drug use. Frequency: 7.00 times per week. Drug: Marijuana.    Family History:  Hypertension    Allergies:  No Known Allergies  Medications:  Prior to Admission medications    Not on File     Objective     Vital Signs: BP (!) 185/124   Pulse 72   Temp 98.6 °F (37 °C) (Oral)   Resp 20   Ht 162.6 cm (64\")   Wt 88.5 kg (195 lb)   SpO2 99%   BMI 33.47 kg/m²   Physical Exam  Constitutional:       General: He is not in acute distress.     Appearance: He is ill-appearing. He is not diaphoretic.   HENT:      Head: Normocephalic and atraumatic.      Right Ear: External ear normal.      Left Ear: External ear normal.      Nose: Nose normal.      Mouth/Throat:      Mouth: " Mucous membranes are dry.   Eyes:      General: No scleral icterus.     Extraocular Movements: Extraocular movements intact.      Pupils: Pupils are equal, round, and reactive to light.   Neck:      Musculoskeletal: Normal range of motion and neck supple.   Cardiovascular:      Rate and Rhythm: Normal rate and regular rhythm.      Heart sounds: Murmur present.   Pulmonary:      Effort: Pulmonary effort is normal. No respiratory distress.      Breath sounds: Normal breath sounds. No stridor. No wheezing or rhonchi.   Abdominal:      General: Abdomen is flat. Bowel sounds are normal. There is no distension.      Palpations: There is no mass.   Musculoskeletal: Normal range of motion.         General: No swelling, tenderness, deformity or signs of injury.   Skin:     General: Skin is warm.      Capillary Refill: Capillary refill takes less than 2 seconds.      Coloration: Skin is not jaundiced or pale.      Findings: No bruising, erythema, lesion or rash.   Neurological:      General: No focal deficit present.      Mental Status: He is alert and oriented to person, place, and time. Mental status is at baseline.      Cranial Nerves: No cranial nerve deficit.      Sensory: No sensory deficit.      Motor: No weakness.      Coordination: Coordination normal.   Psychiatric:         Mood and Affect: Mood normal.         Behavior: Behavior normal.     Results Reviewed:  Lab Results (last 24 hours)     Procedure Component Value Units Date/Time    COVID PRE-OP / PRE-PROCEDURE SCREENING ORDER (NO ISOLATION) - Swab, Nasal Cavity [[id number]]  (Normal) Collected: 10/10/20 0205    Specimen: Swab from Nasal Cavity Updated: 10/10/20 0242    Narrative:      The following orders were created for panel order COVID PRE-OP / PRE-PROCEDURE SCREENING ORDER (NO ISOLATION) - Swab, Nasal Cavity.  Procedure                               Abnormality         Status                     ---------                               -----------          ------                     COVID-19, ABBOTT IN-HOUS...[[id number]]  Normal              Final result                 Please view results for these tests on the individual orders.    COVID-19, [facility]BBOTT IN-HOUSE,NP Swab (NO TRANSPORT MEDIA) 2 HR TAT - Swab, Nasal Cavity [[id number]]  (Normal) Collected: 10/10/20 0205    Specimen: Swab from Nasal Cavity Updated: 10/10/20 0242     COVID19 Not Detected    Narrative:      Fact sheet for providers: https://www.fda.gov/media/725699/download     Fact sheet for patients: https://www.fda.gov/media/252877/download    CK [[id number]]  (Normal) Collected: 10/10/20 0159    Specimen: Blood Updated: 10/10/20 0226     Creatine Kinase 123 U/L     Troponin [[id number]]  (Normal) Collected: 10/10/20 0159    Specimen: Blood Updated: 10/10/20 0224     Troponin T <0.010 ng/mL     Narrative:      Troponin T Reference Range:  <= 0.03 ng/mL-   Negative for AMI  >0.03 ng/mL-     Abnormal for myocardial necrosis.  Clinicians would have to utilize clinical acumen, EKG, Troponin and serial changes to determine if it is an Acute Myocardial Infarction or myocardial injury due to an underlying chronic condition.       Results may be falsely decreased if patient taking Biotin.      Basic Metabolic Panel [[id number]]  (Abnormal) Collected: 10/10/20 0159    Specimen: Blood Updated: 10/10/20 0223     Glucose 103 mg/dL      BUN 14 mg/dL      Creatinine 0.77 mg/dL      Sodium 138 mmol/L      Potassium 3.4 mmol/L      Chloride 102 mmol/L      CO2 28.0 mmol/L      Calcium 9.2 mg/dL      eGFR Non African Amer 122 mL/min/1.73      BUN/Creatinine Ratio 18.2     Anion Gap 8.0 mmol/L     Narrative:      GFR Normal >60  Chronic Kidney Disease <60  Kidney Failure <15      Blood Culture - Blood, Blood, Venous Line [[id number]] Collected: 10/10/20 0215    Specimen: Blood, Venous Line Updated: 10/10/20 0221    Protime-INR [[id number]]  (Abnormal) Collected: 10/10/20 0159    Specimen: Blood Updated: 10/10/20 0214     Protime  13.9 Seconds      INR 1.11    aPTT [[id number]]  (Normal) Collected: 10/10/20 0159    Specimen: Blood Updated: 10/10/20 0214     PTT 28.8 seconds     CBC & Differential [[id number]]  (Abnormal) Collected: 10/10/20 0159    Specimen: Blood Updated: 10/10/20 0207    Narrative:      The following orders were created for panel order CBC & Differential.  Procedure                               Abnormality         Status                     ---------                               -----------         ------                     CBC Auto Differential[[id number]]        Abnormal            Final result                 Please view results for these tests on the individual orders.    CBC Auto Differential [[id number]]  (Abnormal) Collected: 10/10/20 0159    Specimen: Blood Updated: 10/10/20 0207     WBC 9.09 10*3/mm3      RBC 5.25 10*6/mm3      Hemoglobin 15.4 g/dL      Hematocrit 45.6 %      MCV 86.9 fL      MCH 29.3 pg      MCHC 33.8 g/dL      RDW 12.6 %      RDW-SD 39.6 fl      MPV 10.3 fL      Platelets 220 10*3/mm3      Neutrophil % 71.4 %      Lymphocyte % 18.8 %      Monocyte % 8.0 %      Eosinophil % 0.8 %      Basophil % 0.7 %      Immature Grans % 0.3 %      Neutrophils, Absolute 6.49 10*3/mm3      Lymphocytes, Absolute 1.71 10*3/mm3      Monocytes, Absolute 0.73 10*3/mm3      Eosinophils, Absolute 0.07 10*3/mm3      Basophils, Absolute 0.06 10*3/mm3      Immature Grans, Absolute 0.03 10*3/mm3      nRBC 0.0 /100 WBC     Blood Culture - Blood, Arm, Right [[id number]] Collected: 10/10/20 0159    Specimen: Blood from Arm, Right Updated: 10/10/20 0206        Imaging Results (Last 24 Hours)     Procedure Component Value Units Date/Time    XR Chest 1 View [[id number]] Resulted: 10/10/20 0319     Updated: 10/10/20 0319        I have personally reviewed and interpreted the radiology studies and ECG obtained at time of admission.     Assessment / Plan     Assessment:   Active Hospital Problems    Diagnosis   • Bacterial endocarditis,  rule out   • Uncontrolled hypertension   • Alcohol abuse     Plan:   Admit to telemetry, vitals every 4 hours  Npo except ice chips  Cardiology consult in AM  Lovenox full dose and transition back to Coumadin  INR daily  Check ESR/CRP  Rocephin/Vancomycin IV   Tylenol as needed for fever or pain      Code Status: Full     I discussed the patient's findings and my recommendations with the patient    Estimated length of stay over 2 midnights    Patient seen and examined by me on 10/10/2020 at 545 AM.    Electronically signed by Tim Smith MD, 10/10/20, 03:30 CDT.

## 2020-10-11 ENCOUNTER — APPOINTMENT (OUTPATIENT)
Dept: CARDIOLOGY | Facility: HOSPITAL | Age: 25
End: 2020-10-11

## 2020-10-11 VITALS
TEMPERATURE: 98.2 F | RESPIRATION RATE: 16 BRPM | DIASTOLIC BLOOD PRESSURE: 99 MMHG | BODY MASS INDEX: 33.2 KG/M2 | SYSTOLIC BLOOD PRESSURE: 142 MMHG | OXYGEN SATURATION: 98 % | HEART RATE: 67 BPM | HEIGHT: 64 IN | WEIGHT: 194.45 LBS

## 2020-10-11 LAB
ANION GAP SERPL CALCULATED.3IONS-SCNC: 10 MMOL/L (ref 5–15)
BH CV ECHO MEAS - AO MAX PG (FULL): 18.7 MMHG
BH CV ECHO MEAS - AO MAX PG: 21.3 MMHG
BH CV ECHO MEAS - AO MEAN PG (FULL): 11 MMHG
BH CV ECHO MEAS - AO MEAN PG: 13 MMHG
BH CV ECHO MEAS - AO ROOT AREA (BSA CORRECTED): 1.7
BH CV ECHO MEAS - AO ROOT AREA: 8.6 CM^2
BH CV ECHO MEAS - AO ROOT DIAM: 3.3 CM
BH CV ECHO MEAS - AO V2 MAX: 231 CM/SEC
BH CV ECHO MEAS - AO V2 MEAN: 169 CM/SEC
BH CV ECHO MEAS - AO V2 VTI: 44.1 CM
BH CV ECHO MEAS - AVA(I,A): 1 CM^2
BH CV ECHO MEAS - AVA(I,D): 1 CM^2
BH CV ECHO MEAS - AVA(V,A): 1 CM^2
BH CV ECHO MEAS - AVA(V,D): 1 CM^2
BH CV ECHO MEAS - BSA(HAYCOCK): 2 M^2
BH CV ECHO MEAS - BSA: 1.9 M^2
BH CV ECHO MEAS - BZI_BMI: 33.3 KILOGRAMS/M^2
BH CV ECHO MEAS - BZI_METRIC_HEIGHT: 162.6 CM
BH CV ECHO MEAS - BZI_METRIC_WEIGHT: 88 KG
BH CV ECHO MEAS - EDV(CUBED): 143.1 ML
BH CV ECHO MEAS - EDV(MOD-SP4): 121 ML
BH CV ECHO MEAS - EDV(TEICH): 131.2 ML
BH CV ECHO MEAS - EF(CUBED): 60.4 %
BH CV ECHO MEAS - EF(MOD-SP4): 51.5 %
BH CV ECHO MEAS - EF(TEICH): 51.6 %
BH CV ECHO MEAS - ESV(CUBED): 56.6 ML
BH CV ECHO MEAS - ESV(MOD-SP4): 58.7 ML
BH CV ECHO MEAS - ESV(TEICH): 63.5 ML
BH CV ECHO MEAS - FS: 26.6 %
BH CV ECHO MEAS - IVS/LVPW: 0.9
BH CV ECHO MEAS - IVSD: 1.3 CM
BH CV ECHO MEAS - LA DIMENSION: 3.9 CM
BH CV ECHO MEAS - LA/AO: 1.2
BH CV ECHO MEAS - LAT PEAK E' VEL: 7.2 CM/SEC
BH CV ECHO MEAS - LV DIASTOLIC VOL/BSA (35-75): 62.7 ML/M^2
BH CV ECHO MEAS - LV MASS(C)D: 290.2 GRAMS
BH CV ECHO MEAS - LV MASS(C)DI: 150.3 GRAMS/M^2
BH CV ECHO MEAS - LV MAX PG: 2.6 MMHG
BH CV ECHO MEAS - LV MEAN PG: 2 MMHG
BH CV ECHO MEAS - LV SYSTOLIC VOL/BSA (12-30): 30.4 ML/M^2
BH CV ECHO MEAS - LV V1 MAX: 81.1 CM/SEC
BH CV ECHO MEAS - LV V1 MEAN: 62.7 CM/SEC
BH CV ECHO MEAS - LV V1 VTI: 15.7 CM
BH CV ECHO MEAS - LVIDD: 5.2 CM
BH CV ECHO MEAS - LVIDS: 3.8 CM
BH CV ECHO MEAS - LVLD AP4: 9.9 CM
BH CV ECHO MEAS - LVLS AP4: 8.8 CM
BH CV ECHO MEAS - LVOT AREA (M): 2.8 CM^2
BH CV ECHO MEAS - LVOT AREA: 2.8 CM^2
BH CV ECHO MEAS - LVOT DIAM: 1.9 CM
BH CV ECHO MEAS - LVPWD: 1.4 CM
BH CV ECHO MEAS - MED PEAK E' VEL: 7.4 CM/SEC
BH CV ECHO MEAS - MV A MAX VEL: 76.5 CM/SEC
BH CV ECHO MEAS - MV DEC TIME: 0.17 SEC
BH CV ECHO MEAS - MV E MAX VEL: 122 CM/SEC
BH CV ECHO MEAS - MV E/A: 1.6
BH CV ECHO MEAS - PI END-D VEL: 124 CM/SEC
BH CV ECHO MEAS - SI(AO): 195.3 ML/M^2
BH CV ECHO MEAS - SI(CUBED): 44.8 ML/M^2
BH CV ECHO MEAS - SI(LVOT): 23.1 ML/M^2
BH CV ECHO MEAS - SI(MOD-SP4): 32.3 ML/M^2
BH CV ECHO MEAS - SI(TEICH): 35.1 ML/M^2
BH CV ECHO MEAS - SV(AO): 377.2 ML
BH CV ECHO MEAS - SV(CUBED): 86.4 ML
BH CV ECHO MEAS - SV(LVOT): 44.5 ML
BH CV ECHO MEAS - SV(MOD-SP4): 62.3 ML
BH CV ECHO MEAS - SV(TEICH): 67.7 ML
BH CV ECHO MEASUREMENTS AVERAGE E/E' RATIO: 16.71
BUN SERPL-MCNC: 11 MG/DL (ref 6–20)
BUN/CREAT SERPL: 15.1 (ref 7–25)
CALCIUM SPEC-SCNC: 9.2 MG/DL (ref 8.6–10.5)
CHLORIDE SERPL-SCNC: 108 MMOL/L (ref 98–107)
CO2 SERPL-SCNC: 22 MMOL/L (ref 22–29)
CREAT SERPL-MCNC: 0.73 MG/DL (ref 0.76–1.27)
DEPRECATED RDW RBC AUTO: 41.1 FL (ref 37–54)
ERYTHROCYTE [DISTWIDTH] IN BLOOD BY AUTOMATED COUNT: 12.8 % (ref 12.3–15.4)
GFR SERPL CREATININE-BSD FRML MDRD: 130 ML/MIN/1.73
GLUCOSE SERPL-MCNC: 156 MG/DL (ref 65–99)
HCT VFR BLD AUTO: 45.8 % (ref 37.5–51)
HGB BLD-MCNC: 15.6 G/DL (ref 13–17.7)
INR PPP: 1.14 (ref 0.91–1.09)
MCH RBC QN AUTO: 29.9 PG (ref 26.6–33)
MCHC RBC AUTO-ENTMCNC: 34.1 G/DL (ref 31.5–35.7)
MCV RBC AUTO: 87.9 FL (ref 79–97)
PLATELET # BLD AUTO: 214 10*3/MM3 (ref 140–450)
PMV BLD AUTO: 11.1 FL (ref 6–12)
POTASSIUM SERPL-SCNC: 3.9 MMOL/L (ref 3.5–5.2)
PROTHROMBIN TIME: 14.2 SECONDS (ref 11.9–14.6)
RBC # BLD AUTO: 5.21 10*6/MM3 (ref 4.14–5.8)
SODIUM SERPL-SCNC: 140 MMOL/L (ref 136–145)
VANCOMYCIN TROUGH SERPL-MCNC: 15.3 MCG/ML (ref 5–20)
WBC # BLD AUTO: 7.5 10*3/MM3 (ref 3.4–10.8)

## 2020-10-11 PROCEDURE — 93356 MYOCRD STRAIN IMG SPCKL TRCK: CPT

## 2020-10-11 PROCEDURE — 25010000002 CEFTRIAXONE PER 250 MG: Performed by: FAMILY MEDICINE

## 2020-10-11 PROCEDURE — 25010000002 INFLUENZA VAC SPLIT QUAD 0.5 ML SUSPENSION PREFILLED SYRINGE: Performed by: FAMILY MEDICINE

## 2020-10-11 PROCEDURE — 85610 PROTHROMBIN TIME: CPT | Performed by: FAMILY MEDICINE

## 2020-10-11 PROCEDURE — 93306 TTE W/DOPPLER COMPLETE: CPT

## 2020-10-11 PROCEDURE — 99232 SBSQ HOSP IP/OBS MODERATE 35: CPT | Performed by: PHYSICIAN ASSISTANT

## 2020-10-11 PROCEDURE — 85027 COMPLETE CBC AUTOMATED: CPT | Performed by: INTERNAL MEDICINE

## 2020-10-11 PROCEDURE — 25010000002 VANCOMYCIN PER 500 MG: Performed by: FAMILY MEDICINE

## 2020-10-11 PROCEDURE — 93356 MYOCRD STRAIN IMG SPCKL TRCK: CPT | Performed by: INTERNAL MEDICINE

## 2020-10-11 PROCEDURE — 93306 TTE W/DOPPLER COMPLETE: CPT | Performed by: INTERNAL MEDICINE

## 2020-10-11 PROCEDURE — G0008 ADMIN INFLUENZA VIRUS VAC: HCPCS | Performed by: FAMILY MEDICINE

## 2020-10-11 PROCEDURE — 80202 ASSAY OF VANCOMYCIN: CPT | Performed by: INTERNAL MEDICINE

## 2020-10-11 PROCEDURE — 90686 IIV4 VACC NO PRSV 0.5 ML IM: CPT | Performed by: FAMILY MEDICINE

## 2020-10-11 PROCEDURE — 25010000002 ENOXAPARIN PER 10 MG: Performed by: FAMILY MEDICINE

## 2020-10-11 PROCEDURE — 80048 BASIC METABOLIC PNL TOTAL CA: CPT | Performed by: FAMILY MEDICINE

## 2020-10-11 RX ORDER — LISINOPRIL 5 MG/1
5 TABLET ORAL
Qty: 30 TABLET | Refills: 1 | Status: SHIPPED | OUTPATIENT
Start: 2020-10-12

## 2020-10-11 RX ORDER — WARFARIN SODIUM 5 MG/1
5 TABLET ORAL NIGHTLY
Qty: 30 TABLET | Refills: 1 | Status: SHIPPED | OUTPATIENT
Start: 2020-10-11

## 2020-10-11 RX ADMIN — INFLUENZA VIRUS VACCINE 0.5 ML: 15; 15; 15; 15 SUSPENSION INTRAMUSCULAR at 15:52

## 2020-10-11 RX ADMIN — SODIUM CHLORIDE 75 ML/HR: 9 INJECTION, SOLUTION INTRAVENOUS at 04:40

## 2020-10-11 RX ADMIN — CEFTRIAXONE SODIUM 2 G: 2 INJECTION, POWDER, FOR SOLUTION INTRAMUSCULAR; INTRAVENOUS at 04:41

## 2020-10-11 RX ADMIN — LABETALOL HYDROCHLORIDE 10 MG: 5 INJECTION, SOLUTION INTRAVENOUS at 05:17

## 2020-10-11 RX ADMIN — VANCOMYCIN HYDROCHLORIDE 1000 MG: 1 INJECTION, SOLUTION INTRAVENOUS at 04:41

## 2020-10-11 RX ADMIN — ENOXAPARIN SODIUM 90 MG: 100 INJECTION SUBCUTANEOUS at 05:16

## 2020-10-11 RX ADMIN — LISINOPRIL 5 MG: 5 TABLET ORAL at 08:04

## 2020-10-11 RX ADMIN — METOPROLOL TARTRATE 25 MG: 25 TABLET, FILM COATED ORAL at 08:04

## 2020-10-11 NOTE — PROGRESS NOTES
"[facility], [facility] Progress Note     LOS: 1 day   Patient Care Team:  Provider, No Known as PCP - General    Chief Complaint:  Night sweats    Subjective     Pt is doing ok without any further c/o    Review of Systems:   A 10-point review of systems is obtained and negative except for otherwise mentioned above.    Objective     Vital Sign Min/Max for last 24 hours  Temp  Min: 97.9 °F (36.6 °C)  Max: 98.9 °F (37.2 °C)   BP  Min: 142/91  Max: 186/102   Pulse  Min: 62  Max: 82   Resp  Min: 16  Max: 20   SpO2  Min: 97 %  Max: 99 %   No data recorded   Weight  Min: 88.2 kg (194 lb 6.4 oz)  Max: 88.2 kg (194 lb 6.4 oz)     Flowsheet Rows      First Filed Value   Admission Height  162.6 cm (64\") Documented at 10/10/2020 0019   Admission Weight  88.5 kg (195 lb) Documented at 10/10/2020 0019          Physical Exam:   General Appearance: NAD  Lungs: CTAB  Heart: RRR  Extremities: no edema     Results Review:     I reviewed the patient's new clinical results.    Results from last 7 days   Lab Units 10/11/20  0238   WBC 10*3/mm3 7.50   HEMOGLOBIN g/dL 15.6   HEMATOCRIT % 45.8   PLATELETS 10*3/mm3 214     Results from last 7 days   Lab Units 10/11/20  0238   SODIUM mmol/L 140   POTASSIUM mmol/L 3.9   CHLORIDE mmol/L 108*   CO2 mmol/L 22.0   BUN mg/dL 11   CREATININE mg/dL 0.73*   GLUCOSE mg/dL 156*   CALCIUM mg/dL 9.2     Results from last 7 days   Lab Units 10/11/20  0238   SODIUM mmol/L 140   POTASSIUM mmol/L 3.9   CHLORIDE mmol/L 108*   CO2 mmol/L 22.0   BUN mg/dL 11   CREATININE mg/dL 0.73*   CALCIUM mg/dL 9.2   GLUCOSE mg/dL 156*     Results from last 7 days   Lab Units 10/10/20  0159   CK TOTAL U/L 123   TROPONIN T ng/mL <0.010               Medication Review: yes    Assessment/Plan       Bacterial endocarditis, rule out    Uncontrolled hypertension    Alcohol abuse    Tobacco abuse    History of intravenous drug abuse (CMS/HCC)    Chronic anticoagulation    History of mechanical " aortic valve replacement    Hypokalemia  Subtherapeutic INR    Continue Lovenox and Coumadin.  Await blood cultures and echo.  Pt states he has difficulty getting transportation to his pharmacy for Coumadin.  He has been off of this medication for around a year.  Consequences of same are d/w pt.  I will consult  to see if pt can get three month supply by mail order pharmacy to aid in patient compliance.    Jagruti Lam PA-C  10/11/20  09:25 CDT

## 2020-10-11 NOTE — PROGRESS NOTES
Continued Stay Note   Gigi     Patient Name: Sher Drew  MRN: [id number]  Today's Date: 10/11/2020    Admit Date: 10/10/2020    Discharge Plan     Row Name 10/11/20 1441       Plan    Plan Comments  SW received consult in regards to pt needing assistance with medications. JENNIFFER spoke with Dr Servin who states that discharge medications will be coumadin 5mg once a day $4, Lovanox 100mg 7 days worth $56.91, and Lisinopril 5mg once daily $4. Pt states that he can afford these medications and requests to send scripts to [facility]. NOEMI Alvarez aware.    Final Discharge Disposition Code  01 - home or self-care        Discharge Codes    No documentation.             Gracie Charles

## 2020-10-11 NOTE — PROGRESS NOTES
"Pharmacy Dosing Service  Pharmacokinetics  Vancomycin Follow-up Evaluation    Assessment/Action/Plan:  Current Order: Vancomycin 1000 mg IVPB every 8 hours  Current end date:10/15/20  Additional antimicrobial agent(s): Rocephin    Predicted steady state AUC based on levels drawn today within therapeutic range based on [employer] calculations. Continue current dose of 1g IV q8. Pharmacy will continue to follow daily and adjust dose accordingly.     Subjective:  Sher Drew is a 24 y.o. male currently on Vancomycin 1000 mg IV every 8 hours for the treatment of endocarditis, day 3 of treatment.    AUC Model Data:    Regimen: 1000 mg every 8 hours   Exposure target: AUC24 (range)400-600 mg/L.hr  AUC24,ss: 479 mg/L.hr  PAUC*: 89 %  Ctrough,ss: 13.2 mg/L  Pconc*: 0 %  Tox.: 8 %      Objective:  Ht: 162.6 cm (64.02\"); Wt: 88.2 kg (194 lb 7.1 oz)  Estimated Creatinine Clearance: 153.6 mL/min (A) (by C-G formula based on SCr of 0.73 mg/dL (L)).   Creatinine   Date Value Ref Range Status   10/11/2020 0.73 (L) 0.76 - 1.27 mg/dL Final   10/10/2020 0.88 0.76 - 1.27 mg/dL Final   10/10/2020 0.77 0.76 - 1.27 mg/dL Final   Lab Results   Component Value Date    WBC 7.50 10/11/2020    WBC 11.56 (H) 10/10/2020    WBC 9.09 10/10/2020         Lab Results   Component Value Date    VANCOTROUGH 15.30 10/11/2020       Culture Results:  Microbiology Results (last 10 days)       Procedure Component Value - Date/Time    Blood Culture - Blood, Blood, Venous Line [[id number]] Collected: 10/10/20 0215    Lab Status: Preliminary result Specimen: Blood, Venous Line Updated: 10/11/20 0230     Blood Culture No growth at 24 hours    COVID PRE-OP / PRE-PROCEDURE SCREENING ORDER (NO ISOLATION) - Swab, Nasal Cavity [[id number]]  (Normal) Collected: 10/10/20 0205    Lab Status: Final result Specimen: Swab from Nasal Cavity Updated: 10/10/20 0242    Narrative:      The following orders were created for panel order COVID PRE-OP / PRE-PROCEDURE " SCREENING ORDER (NO ISOLATION) - Swab, Nasal Cavity.  Procedure                               Abnormality         Status                     ---------                               -----------         ------                     COVID-19, ABBOTT IN-HOUS...[[id number]]  Normal              Final result                 Please view results for these tests on the individual orders.    COVID-19, ABBOTT IN-HOUSE,NP Swab (NO TRANSPORT MEDIA) 2 HR TAT - Swab, Nasal Cavity [[id number]]  (Normal) Collected: 10/10/20 0205    Lab Status: Final result Specimen: Swab from Nasal Cavity Updated: 10/10/20 0242     COVID19 Not Detected    Narrative:      Fact sheet for providers: https://www.fda.gov/media/000931/download     Fact sheet for patients: https://www.fda.gov/media/535030/download    Blood Culture - Blood, Arm, Right [[id number]] Collected: 10/10/20 0159    Lab Status: Preliminary result Specimen: Blood from Arm, Right Updated: 10/11/20 0215     Blood Culture No growth at 24 hours            ANMOL David PharmD   10/11/20 14:35 CDT

## 2020-10-11 NOTE — DISCHARGE SUMMARY
[facility] Medicine Services  DISCHARGE SUMMARY       Date of Admission: 10/10/2020  Date of Discharge:  10/11/2020  Primary Care Physician: Provider, No Known    Presenting Problem/History of Present Illness:  Night sweats.    Final Discharge Diagnoses:  Active Hospital Problems    Diagnosis   • **Concern for bacterial endocarditis, RULED OUT   • History of intravenous drug abuse (CMS/[facility])   • Chronic anticoagulation   • History of mechanical aortic valve replacement   • Uncontrolled hypertension   • Alcohol abuse   • Tobacco abuse   • Hypokalemia     Consults: Dr. Villatoro with cardiology.     Procedures Performed:   Results for orders placed during the hospital encounter of 10/10/20   Adult Transthoracic Echo Complete W/ Cont if Necessary Per Protocol    Narrative · Left ventricular wall thickness is consistent with concentric   hypertrophy.  · Left ventricular ejection fraction appears to be 46 - 50%. Left   ventricular systolic function is mildly decreased.  · Left ventricular diastolic function is consistent with (grade II w/high   LAP) pseudonormalization.  · There is a mechanical aortic valve prosthesis present.  · Trace mitral valve regurgitation is present.  · Mild pulmonic valve regurgitation is present.        Pertinent Test Results:   Imaging Results (Last 7 Days)     Procedure Component Value Units Date/Time    XR Chest 1 View [[id number]] Collected: 10/10/20 1050     Updated: 10/10/20 1054    Narrative:      EXAMINATION: Chest 1 view 10/10/2020     HISTORY: Mechanical heart valve off medications. Chills.     FINDINGS: Upright frontal projection of the chest demonstrates no  evidence of acute cardiopulmonary disease. There is no effusion or free  air present. The patient has undergone prior median sternotomy with  aortic valve replacement.       Impression:      . No acute disease.  This report was finalized on 10/10/2020 10:51 by Dr. Dante Armstrong MD.        Lab  Results (last 7 days)     Procedure Component Value Units Date/Time    Vancomycin, Trough [[id number]]  (Normal) Collected: 10/11/20 1153    Specimen: Blood Updated: 10/11/20 1226     Vancomycin Trough 15.30 mcg/mL     Basic Metabolic Panel [[id number]]  (Abnormal) Collected: 10/11/20 0238    Specimen: Blood Updated: 10/11/20 0352     Glucose 156 mg/dL      BUN 11 mg/dL      Creatinine 0.73 mg/dL      Sodium 140 mmol/L      Potassium 3.9 mmol/L      Chloride 108 mmol/L      CO2 22.0 mmol/L      Calcium 9.2 mg/dL      eGFR Non African Amer 130 mL/min/1.73      BUN/Creatinine Ratio 15.1     Anion Gap 10.0 mmol/L     Narrative:      GFR Normal >60  Chronic Kidney Disease <60  Kidney Failure <15      Protime-INR [[id number]]  (Abnormal) Collected: 10/11/20 0238    Specimen: Blood Updated: 10/11/20 0341     Protime 14.2 Seconds      INR 1.14    CBC (No Diff) [[id number]]  (Normal) Collected: 10/11/20 0238    Specimen: Blood Updated: 10/11/20 0325     WBC 7.50 10*3/mm3      RBC 5.21 10*6/mm3      Hemoglobin 15.6 g/dL      Hematocrit 45.8 %      MCV 87.9 fL      MCH 29.9 pg      MCHC 34.1 g/dL      RDW 12.8 %      RDW-SD 41.1 fl      MPV 11.1 fL      Platelets 214 10*3/mm3     Blood Culture - Blood, Blood, Venous Line [[id number]] Collected: 10/10/20 0215    Specimen: Blood, Venous Line Updated: 10/11/20 0230     Blood Culture No growth at 24 hours    Blood Culture - Blood, Arm, Right [[id number]] Collected: 10/10/20 0159    Specimen: Blood from Arm, Right Updated: 10/11/20 0215     Blood Culture No growth at 24 hours    Basic Metabolic Panel [[id number]]  (Abnormal) Collected: 10/10/20 0646    Specimen: Blood Updated: 10/10/20 0727     Glucose 122 mg/dL      BUN 13 mg/dL      Creatinine 0.88 mg/dL      Sodium 140 mmol/L      Potassium 3.0 mmol/L      Chloride 104 mmol/L      CO2 25.0 mmol/L      Calcium 9.0 mg/dL      eGFR Non African Amer 105 mL/min/1.73      BUN/Creatinine Ratio 14.8     Anion Gap 11.0 mmol/L     Narrative:        GFR Normal >60  Chronic Kidney Disease <60  Kidney Failure <15      Protime-INR [[id number]]  (Abnormal) Collected: 10/10/20 0646    Specimen: Blood Updated: 10/10/20 0718     Protime 14.5 Seconds      INR 1.17    CBC Auto Differential [[id number]]  (Abnormal) Collected: 10/10/20 0646    Specimen: Blood Updated: 10/10/20 0701     WBC 11.56 10*3/mm3      RBC 5.33 10*6/mm3      Hemoglobin 15.8 g/dL      Hematocrit 45.9 %      MCV 86.1 fL      MCH 29.6 pg      MCHC 34.4 g/dL      RDW 12.7 %      RDW-SD 39.4 fl      MPV 10.3 fL      Platelets 217 10*3/mm3      Neutrophil % 79.9 %      Lymphocyte % 11.8 %      Monocyte % 6.4 %      Eosinophil % 0.5 %      Basophil % 0.5 %      Immature Grans % 0.9 %      Neutrophils, Absolute 9.24 10*3/mm3      Lymphocytes, Absolute 1.36 10*3/mm3      Monocytes, Absolute 0.74 10*3/mm3      Eosinophils, Absolute 0.06 10*3/mm3      Basophils, Absolute 0.06 10*3/mm3      Immature Grans, Absolute 0.10 10*3/mm3      nRBC 0.0 /100 WBC     C-reactive Protein [[id number]]  (Normal) Collected: 10/10/20 0159    Specimen: Blood Updated: 10/10/20 0653     C-Reactive Protein 0.36 mg/dL     Magnesium [[id number]]  (Normal) Collected: 10/10/20 0159    Specimen: Blood Updated: 10/10/20 0653     Magnesium 2.1 mg/dL     Sedimentation Rate [[id number]]  (Normal) Collected: 10/10/20 0159    Specimen: Blood Updated: 10/10/20 0643     Sed Rate 1 mm/hr     Urine Drug Screen - Urine, Clean Catch [[id number]]  (Abnormal) Collected: 10/10/20 0447    Specimen: Urine, Clean Catch Updated: 10/10/20 0509     THC, Screen, Urine Positive     Phencyclidine (PCP), Urine Negative     Cocaine Screen, Urine Negative     Methamphetamine, Ur Negative     Opiate Screen Negative     Amphetamine Screen, Urine Negative     Benzodiazepine Screen, Urine Negative     Tricyclic Antidepressants Screen Negative     Methadone Screen, Urine Negative     Barbiturates Screen, Urine Negative     Oxycodone Screen, Urine Negative      Propoxyphene Screen Negative     Buprenorphine, Screen, Urine Negative    Narrative:      Cutoff For Drugs Screened:    Amphetamines               500 ng/ml  Barbiturates               200 ng/ml  Benzodiazepines            150 ng/ml  Cocaine                    150 ng/ml  Methadone                  200 ng/ml  Opiates                    100 ng/ml  Phencyclidine               25 ng/ml  THC                            50 ng/ml  Methamphetamine            500 ng/ml  Tricyclic Antidepressants  300 ng/ml  Oxycodone                  100 ng/ml  Propoxyphene               300 ng/ml  Buprenorphine               10 ng/ml    The normal value for all drugs tested is negative. This report includes unconfirmed screening results, with the cutoff values listed, to be used for medical treatment purposes only.  Unconfirmed results must not be used for non-medical purposes such as employment or legal testing.  Clinical consideration should be applied to any drug of abuse test, particularly when unconfirmed results are used.      COVID PRE-OP / PRE-PROCEDURE SCREENING ORDER (NO ISOLATION) - Swab, Nasal Cavity [[id number]]  (Normal) Collected: 10/10/20 0205    Specimen: Swab from Nasal Cavity Updated: 10/10/20 0242    Narrative:      The following orders were created for panel order COVID PRE-OP / PRE-PROCEDURE SCREENING ORDER (NO ISOLATION) - Swab, Nasal Cavity.  Procedure                               Abnormality         Status                     ---------                               -----------         ------                     COVID-19, ABBOTT IN-HOUS...[[id number]]  Normal              Final result                 Please view results for these tests on the individual orders.    COVID-19, ABBOTT IN-HOUSE,NP Swab (NO TRANSPORT MEDIA) 2 HR TAT - Swab, Nasal Cavity [[id number]]  (Normal) Collected: 10/10/20 0205    Specimen: Swab from Nasal Cavity Updated: 10/10/20 0242     COVID19 Not Detected    Narrative:      Fact sheet for  providers: https://www.fda.gov/media/447509/download     Fact sheet for patients: https://www.fda.gov/media/254685/download    CK [[id number]]  (Normal) Collected: 10/10/20 0159    Specimen: Blood Updated: 10/10/20 0226     Creatine Kinase 123 U/L     Troponin [[id number]]  (Normal) Collected: 10/10/20 0159    Specimen: Blood Updated: 10/10/20 0224     Troponin T <0.010 ng/mL     Narrative:      Troponin T Reference Range:  <= 0.03 ng/mL-   Negative for AMI  >0.03 ng/mL-     Abnormal for myocardial necrosis.  Clinicians would have to utilize clinical acumen, EKG, Troponin and serial changes to determine if it is an Acute Myocardial Infarction or myocardial injury due to an underlying chronic condition.       Results may be falsely decreased if patient taking Biotin.      Basic Metabolic Panel [[id number]]  (Abnormal) Collected: 10/10/20 0159    Specimen: Blood Updated: 10/10/20 0223     Glucose 103 mg/dL      BUN 14 mg/dL      Creatinine 0.77 mg/dL      Sodium 138 mmol/L      Potassium 3.4 mmol/L      Chloride 102 mmol/L      CO2 28.0 mmol/L      Calcium 9.2 mg/dL      eGFR Non African Amer 122 mL/min/1.73      BUN/Creatinine Ratio 18.2     Anion Gap 8.0 mmol/L     Narrative:      GFR Normal >60  Chronic Kidney Disease <60  Kidney Failure <15      Protime-INR [[id number]]  (Abnormal) Collected: 10/10/20 0159    Specimen: Blood Updated: 10/10/20 0214     Protime 13.9 Seconds      INR 1.11    aPTT [[id number]]  (Normal) Collected: 10/10/20 0159    Specimen: Blood Updated: 10/10/20 0214     PTT 28.8 seconds     CBC Auto Differential [[id number]]  (Abnormal) Collected: 10/10/20 0159    Specimen: Blood Updated: 10/10/20 0207     WBC 9.09 10*3/mm3      RBC 5.25 10*6/mm3      Hemoglobin 15.4 g/dL      Hematocrit 45.6 %      MCV 86.9 fL      MCH 29.3 pg      MCHC 33.8 g/dL      RDW 12.6 %      RDW-SD 39.6 fl      MPV 10.3 fL      Platelets 220 10*3/mm3      Neutrophil % 71.4 %      Lymphocyte % 18.8 %      Monocyte % 8.0 %       Eosinophil % 0.8 %      Basophil % 0.7 %      Immature Grans % 0.3 %      Neutrophils, Absolute 6.49 10*3/mm3      Lymphocytes, Absolute 1.71 10*3/mm3      Monocytes, Absolute 0.73 10*3/mm3      Eosinophils, Absolute 0.07 10*3/mm3      Basophils, Absolute 0.06 10*3/mm3      Immature Grans, Absolute 0.03 10*3/mm3      nRBC 0.0 /100 WBC         Hospital Course:  Admitted after midnight on 10/10 by Dr. Smith at the request of Dr. Manuel.      Discussed with his nurse, Liss, each day as well as cardiology.      Patient has a history of a mechanical aortic valve replacement several years ago in [place] secondary to bacterial endocarditis from IV drug abuse.  He was admitted at the request of the ER as he has been experiencing night sweats.  There was some concern about possible recurrence.  He apparently continues to drink and smoke heavily, but denies recent IVDA.  Urine drug screen positive for THC.     Blood cultures negative thus far.  The patient was placed on ceftriaxone and vancomycin, which will be stopped. CRP completely normal.  No fever.  Slight leukocytosis.     Cardiology was consulted to evaluate and possibly do a transesophageal echocardiogram. TTE was done instead given negative cultures and normal CRP.  The metallic aortic valve is well-seated with no apparent problems.    Cardiology has stated that he can be discharged.   is seeing him.  We have confirmed that he can afford a bridge of therapeutic Lovenox to therapeutic Coumadin level.  He will also be discharged on lisinopril and metoprolol.  INR 1.14 at discharge.  He will follow-up this week with a repeat INR after he is able to establish care with Dr. Gonzalez.     He was provided a nicotine patch.  He was counseled for tobacco cessation.  He did not experience any signs of alcohol withdrawal.      Replaced potassium.  Magnesium normal.     He very much wants to go home today.  He states that he is anxious to be more compliant  "with his medical regimen.  He states that he will definitely follow-up this week.  He will also have follow-up with cardiology soon.    Physical Exam on Discharge:  /99 (BP Location: Right arm, Patient Position: Lying)   Pulse 67   Temp 98.2 °F (36.8 °C) (Oral)   Resp 16   Ht 162.6 cm (64.02\")   Wt 88.2 kg (194 lb 7.1 oz)   SpO2 98%   BMI 33.36 kg/m²   Physical Exam  Vitals signs and nursing note reviewed.   Constitutional:       General: He is not in acute distress.     Appearance: Normal appearance. He is not diaphoretic.   HENT:      Head: Normocephalic and atraumatic.   Eyes:      Extraocular Movements: Extraocular movements intact.      Pupils: Pupils are equal, round, and reactive to light.   Cardiovascular:      Rate and Rhythm: Normal rate and regular rhythm.      Heart sounds: Murmur (click) present.   Musculoskeletal:         General: No swelling.   Skin:     General: Skin is warm and dry.      Findings: No rash.   Neurological:      Mental Status: He is alert.   Psychiatric:         Mood and Affect: Mood normal.         Behavior: Behavior normal.         Thought Content: Thought content normal.         Judgment: Judgment normal.       Condition on Discharge: Good.     Discharge Disposition:  Home or Self Care    Discharge Medications:     Discharge Medications      New Medications      Instructions Start Date   enoxaparin 100 MG/ML solution syringe  Commonly known as: LOVENOX   1 mg/kg (90 mg), Subcutaneous, Every 12 Hours Scheduled      influenza vac split quad 0.5 ML suspension prefilled syringe injection  Commonly known as: FLUZONE,FLUARIX,AFLURIA,FLULAVAL   0.5 mL, Intramuscular, Once      lisinopril 5 MG tablet  Commonly known as: PRINIVIL,ZESTRIL   5 mg, Oral, Every 24 Hours Scheduled   Start Date: October 12, 2020     metoprolol tartrate 25 MG tablet  Commonly known as: LOPRESSOR   25 mg, Oral, Every 12 Hours Scheduled      warfarin 5 MG tablet  Commonly known as: COUMADIN   5 mg, " Oral, Nightly           Discharge Diet:   Diet Instructions     Diet: Cardiac; Thin      Discharge Diet: Cardiac    Fluid Consistency: Thin        Activity at Discharge:   Activity Instructions     Activity as Tolerated          Follow-up Appointments:   1. Dr. Gonzalez this week as an new patient with a repeat INR.   2. Cardiology per them.     Test Results Pending at Discharge: None.     Electronically signed by Den Servin DO, 10/11/20, 15:03 CDT.    Time: 35 minutes.   _____________________________________________________________    Informed by the charge nurse that the patient is being set up for the cardiology Coumadin clinic post discharge.  Instructions will be given.    Electronically signed by Den Servin DO, 10/11/20, 3:18 PM CDT.

## 2020-10-11 NOTE — PLAN OF CARE
Goal Outcome Evaluation:  Plan of Care Reviewed With: patient  Progress: improving  Outcome Summary: A&Ox4. IVF and IV Abx cont, RA. PRN labetolol given x1 for elevated SBP. no c/o pain. New IV to R FA. Echo today. SB-S 32-59 on tele.  
Goal Outcome Evaluation:  Plan of Care Reviewed With: patient  Progress: no change  Outcome Summary: C/o headache this AM, PRN Tylenol given with relief. C/o nausea/vomiting. Zofran given. IVF infusing per order. IV ABX cont. IV K given today. Lovenox and Coumadin cont. Echo to be done. Safety maintained, cont to monitor.  
Universal Safety Interventions

## 2020-10-15 LAB
BACTERIA SPEC AEROBE CULT: NORMAL
BACTERIA SPEC AEROBE CULT: NORMAL